# Patient Record
Sex: MALE | Race: WHITE
[De-identification: names, ages, dates, MRNs, and addresses within clinical notes are randomized per-mention and may not be internally consistent; named-entity substitution may affect disease eponyms.]

---

## 2018-06-02 ENCOUNTER — HOSPITAL ENCOUNTER (EMERGENCY)
Dept: HOSPITAL 89 - ER | Age: 68
LOS: 1 days | Discharge: HOME | End: 2018-06-03
Payer: MEDICARE

## 2018-06-02 VITALS — DIASTOLIC BLOOD PRESSURE: 80 MMHG | SYSTOLIC BLOOD PRESSURE: 160 MMHG

## 2018-06-02 DIAGNOSIS — R04.2: ICD-10-CM

## 2018-06-02 DIAGNOSIS — J18.1: Primary | ICD-10-CM

## 2018-06-02 LAB
INR PPP: 0.98
PLATELET COUNT, AUTOMATED: 517 K/UL (ref 150–450)

## 2018-06-02 PROCEDURE — 82247 BILIRUBIN TOTAL: CPT

## 2018-06-02 PROCEDURE — 84155 ASSAY OF PROTEIN SERUM: CPT

## 2018-06-02 PROCEDURE — 84460 ALANINE AMINO (ALT) (SGPT): CPT

## 2018-06-02 PROCEDURE — 84520 ASSAY OF UREA NITROGEN: CPT

## 2018-06-02 PROCEDURE — 82310 ASSAY OF CALCIUM: CPT

## 2018-06-02 PROCEDURE — 82565 ASSAY OF CREATININE: CPT

## 2018-06-02 PROCEDURE — 82435 ASSAY OF BLOOD CHLORIDE: CPT

## 2018-06-02 PROCEDURE — 84075 ASSAY ALKALINE PHOSPHATASE: CPT

## 2018-06-02 PROCEDURE — 85025 COMPLETE CBC W/AUTO DIFF WBC: CPT

## 2018-06-02 PROCEDURE — 84450 TRANSFERASE (AST) (SGOT): CPT

## 2018-06-02 PROCEDURE — 84295 ASSAY OF SERUM SODIUM: CPT

## 2018-06-02 PROCEDURE — 85730 THROMBOPLASTIN TIME PARTIAL: CPT

## 2018-06-02 PROCEDURE — 84132 ASSAY OF SERUM POTASSIUM: CPT

## 2018-06-02 PROCEDURE — 96361 HYDRATE IV INFUSION ADD-ON: CPT

## 2018-06-02 PROCEDURE — 82374 ASSAY BLOOD CARBON DIOXIDE: CPT

## 2018-06-02 PROCEDURE — 82947 ASSAY GLUCOSE BLOOD QUANT: CPT

## 2018-06-02 PROCEDURE — 81001 URINALYSIS AUTO W/SCOPE: CPT

## 2018-06-02 PROCEDURE — 82040 ASSAY OF SERUM ALBUMIN: CPT

## 2018-06-02 PROCEDURE — 71275 CT ANGIOGRAPHY CHEST: CPT

## 2018-06-02 PROCEDURE — 99284 EMERGENCY DEPT VISIT MOD MDM: CPT

## 2018-06-02 PROCEDURE — 96360 HYDRATION IV INFUSION INIT: CPT

## 2018-06-02 PROCEDURE — 71046 X-RAY EXAM CHEST 2 VIEWS: CPT

## 2018-06-02 PROCEDURE — 85610 PROTHROMBIN TIME: CPT

## 2018-06-02 NOTE — ER REPORT
History and Physical


Time Seen By MD:  22:25


Hx. of Stated Complaint:  


PATIENT IS ON BLOOD THINNERS; COUGHED UP SOME BLOOD TWICE TONIGHT


HPI/ROS


CHIEF COMPLAINT: coughing with some blood





HISTORY OF PRESENT ILLNESS: This is a 67 year old male. He is a , 

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 

of the country. He had 3 episodes of coughing when he needed to clear phlegm 

and had some blood. No blood with coughing since. No vomiting, and no stomach 

discomfort. He denies other bleeding such as bloody noses. He does bruise 

easily since he is on Plavix for peripheral arterial disease. No blood in urine 

and no blood in stools. No fevers or chills. He does smoke and has chronic  

cough, no worse than usual. No shortness of breath or chest pain. No prior 

history of bleeding in lungs, but did have pneumonia 4 moths ago.


Allergies:  


Coded Allergies:  


     doxycycline (Verified  Allergy, Unknown, 6/2/18)


Home Meds


Active Scripts


Levofloxacin 500 Mg Tab (LEVOFLOXACIN 500 MG TAB) 500 Mg Tablet, 500 MG PO QDAY

, #10 TAB 0 Refills


   Prov:SREE RIVERA MD         6/3/18


Reported Medications


Clopidogrel Bisulfate (CLOPIDOGREL) 75 Mg Tablet, 1 TAB PO QDAY, TAB


   6/2/18


Omeprazole (OMEPRAZOLE) 20 Mg Capsule.dr, 1 CAP PO BID, CAP


   6/2/18


Clopidogrel Bisulfate (PLAVIX) 75 Mg Tablet, 1 TAB PO QDAY, TAB


   6/2/18


Lisinopril (LISINOPRIL) 10 Mg Tablet, 10 MG PO QDAY, TAB


   6/2/18


Hctz/Bisoprolol (ZIAC 10-6.25 MG TABLET) 1 Ea Tab, 12.5 MG PO QDAY, TAB


   6/2/18


Guaifenesin (Guaifenesin ER) 600 Mg Tab.er.12h, 400 MG PO


   6/2/18


Reviewed Nurses Notes:  Yes


Hx Substance Use Disorder:  No


Hx Alcohol Use:  No


Constitutional





Vital Sign - Last 24 Hours








 6/2/18





 22:13


 


Temp 98.4


 


Pulse 95


 


Resp 18


 


B/P (MAP) 160/80


 


Pulse Ox 96


 


O2 Delivery Room Air








Physical Exam


   General Appearance: The patient is alert. No acute distress.


Eyes: Pupils are equal, round. Reactive to light. No pallor, injection or 

icterus. Extraocular movements are intact.


ENT: Mucous membranes are moist. Normal oral mucosa. Posterior oropharynx is 

normal. Normal nasal mucosa. Normal tympanic membranes and canals. 


Neck: Supple and non tender. No lymphadenopathy.


Respiratory: Breathing easily and unlabored. Lungs are clear to auscultation.


Cardiovascular: Regular rate and rhythm. No murmurs, gallops or rubs. Normal 

capillary refill. No edema.


Gastrointestinal:  Abdomen is soft and non tender. Nondistended. Normal active 

bowel sounds. No costovertebral angle tenderness with percussion.


Neurological: Alert and oriented x3.


Skin: Warm and dry. No rashes. Has a few scattered bruises on his arms.


Musculoskeletal: Extremities are nontender. No tenderness in palpation of the 

cervical, thoracic and lumbar spine.





DIFFERENTIAL DIAGNOSIS: After history and physical exam, differential diagnosis 

was considered for hemoptysis, likely surface vessel that burst when he was 

coughing and bleeding on the blood thinner but we'll look for pneumonia, and 

other sources of bleeding including looking at the blood counts.





Medical Decision Making


Data Points


Result Diagram:  


6/2/18 2300                                                                    

            6/2/18 2300





Laboratory





Hematology








Test


  6/2/18


23:00


 


Red Blood Count


  4.33 M/uL


(4.00-5.60)


 


Mean Corpuscular Volume


  65.2 fL


(80.0-96.0)


 


Mean Corpuscular Hemoglobin


  21.6 pg


(26.0-33.0)


 


Mean Corpuscular Hemoglobin


Concent 33.1 g/dL


(32.0-36.0)


 


Red Cell Distribution Width


  18.2 %


(11.5-14.5)


 


Mean Platelet Volume


  6.5 fL


(7.2-11.1)


 


Neutrophils (%) (Auto)


  57.7 %


(39.4-72.5)


 


Lymphocytes (%) (Auto)


  34.2 %


(17.6-49.6)


 


Monocytes (%) (Auto)


  5.6 %


(4.1-12.4)


 


Eosinophils (%) (Auto)


  2.1 %


(0.4-6.7)


 


Basophils (%) (Auto)


  0.4 %


(0.3-1.4)


 


Nucleated RBC Relative Count


(auto) 0.0 /100WBC 


 


 


Neutrophils # (Auto)


  4.2 K/uL


(2.0-7.4)


 


Lymphocytes # (Auto)


  2.5 K/uL


(1.3-3.6)


 


Monocytes # (Auto)


  0.4 K/uL


(0.3-1.0)


 


Eosinophils # (Auto)


  0.2 K/uL


(0.0-0.5)


 


Basophils # (Auto)


  0.0 K/uL


(0.0-0.1)


 


Nucleated RBC Absolute Count


(auto) 0.00 K/uL 


 


 


Peripheral Blood Smear Yes Y/N 


 


Prothrombin Time


  13.0 seconds


(12.0-14.4)


 


Prothromb Time International


Ratio 0.98 


 


 


Activated Partial


Thromboplast Time 34 seconds


(23-35)


 


Urine Color Straw 


 


Urine Clarity Clear 


 


Urine pH


  7.0 pH


(4.8-9.5)


 


Urine Specific Gravity 1.004 


 


Urine Protein


  Negative mg/dL


(NEGATIVE)


 


Urine Glucose (UA)


  Negative mg/dL


(NEGATIVE)


 


Urine Ketones


  Negative mg/dL


(NEGATIVE)


 


Urine Blood


  Negative


(NEGATIVE)


 


Urine Nitrite


  Negative


(NEGATIVE)


 


Urine Bilirubin


  Negative


(NEGATIVE)


 


Urine Urobilinogen


  Negative mg/dL


(0.2-1.9)


 


Urine Leukocyte Esterase


  Negative


(NEGATIVE)


 


Urine RBC


  None /HPF


(0-2/HPF)


 


Urine WBC


  <1 /HPF


(0-5/HPF)


 


Urine Squamous Epithelial


Cells None /LPF


(</=FEW)


 


Urine Bacteria


  Negative /HPF


(NONE-FEW)


 


Urine Mucus


  None /HPF


(NONE-FEW)


 


Sodium Level


  127 mmol/L


(137-145)


 


Potassium Level


  3.9 mmol/L


(3.5-5.0)


 


Chloride Level


  90 mmol/L


()


 


Carbon Dioxide Level


  28 mmol/L


(22-30)


 


Blood Urea Nitrogen


  11 mg/dl


(9-21)


 


Creatinine


  1.00 mg/dl


(0.66-1.25)


 


Glomerular Filtration Rate


Calc > 60.0 


 


 


Random Glucose


  89 mg/dl


()


 


Calcium Level


  9.4 mg/dl


(8.4-10.2)


 


Total Bilirubin


  0.3 mg/dl


(0.2-1.3)


 


Aspartate Amino Transf


(AST/SGOT) 18 U/L (0-35) 


 


 


Alanine Aminotransferase


(ALT/SGPT) 18 U/L (0-56) 


 


 


Alkaline Phosphatase


  105 U/L


(0-126)


 


Total Protein


  6.9 gm/dl


(6.3-8.2)


 


Albumin


  3.8 g/dl


(3.5-5.0)








Chemistry








Test


  6/2/18


23:00


 


White Blood Count


  7.3 k/uL


(4.5-11.0)


 


Red Blood Count


  4.33 M/uL


(4.00-5.60)


 


Hemoglobin


  9.3 g/dL


(14.0-18.0)


 


Hematocrit


  28.2 %


(42.0-52.0)


 


Mean Corpuscular Volume


  65.2 fL


(80.0-96.0)


 


Mean Corpuscular Hemoglobin


  21.6 pg


(26.0-33.0)


 


Mean Corpuscular Hemoglobin


Concent 33.1 g/dL


(32.0-36.0)


 


Red Cell Distribution Width


  18.2 %


(11.5-14.5)


 


Platelet Count


  517 K/uL


(150-450)


 


Mean Platelet Volume


  6.5 fL


(7.2-11.1)


 


Neutrophils (%) (Auto)


  57.7 %


(39.4-72.5)


 


Lymphocytes (%) (Auto)


  34.2 %


(17.6-49.6)


 


Monocytes (%) (Auto)


  5.6 %


(4.1-12.4)


 


Eosinophils (%) (Auto)


  2.1 %


(0.4-6.7)


 


Basophils (%) (Auto)


  0.4 %


(0.3-1.4)


 


Nucleated RBC Relative Count


(auto) 0.0 /100WBC 


 


 


Neutrophils # (Auto)


  4.2 K/uL


(2.0-7.4)


 


Lymphocytes # (Auto)


  2.5 K/uL


(1.3-3.6)


 


Monocytes # (Auto)


  0.4 K/uL


(0.3-1.0)


 


Eosinophils # (Auto)


  0.2 K/uL


(0.0-0.5)


 


Basophils # (Auto)


  0.0 K/uL


(0.0-0.1)


 


Nucleated RBC Absolute Count


(auto) 0.00 K/uL 


 


 


Peripheral Blood Smear Yes Y/N 


 


Prothrombin Time


  13.0 seconds


(12.0-14.4)


 


Prothromb Time International


Ratio 0.98 


 


 


Activated Partial


Thromboplast Time 34 seconds


(23-35)


 


Urine Color Straw 


 


Urine Clarity Clear 


 


Urine pH


  7.0 pH


(4.8-9.5)


 


Urine Specific Gravity 1.004 


 


Urine Protein


  Negative mg/dL


(NEGATIVE)


 


Urine Glucose (UA)


  Negative mg/dL


(NEGATIVE)


 


Urine Ketones


  Negative mg/dL


(NEGATIVE)


 


Urine Blood


  Negative


(NEGATIVE)


 


Urine Nitrite


  Negative


(NEGATIVE)


 


Urine Bilirubin


  Negative


(NEGATIVE)


 


Urine Urobilinogen


  Negative mg/dL


(0.2-1.9)


 


Urine Leukocyte Esterase


  Negative


(NEGATIVE)


 


Urine RBC


  None /HPF


(0-2/HPF)


 


Urine WBC


  <1 /HPF


(0-5/HPF)


 


Urine Squamous Epithelial


Cells None /LPF


(</=FEW)


 


Urine Bacteria


  Negative /HPF


(NONE-FEW)


 


Urine Mucus


  None /HPF


(NONE-FEW)


 


Glomerular Filtration Rate


Calc > 60.0 


 


 


Calcium Level


  9.4 mg/dl


(8.4-10.2)


 


Total Bilirubin


  0.3 mg/dl


(0.2-1.3)


 


Aspartate Amino Transf


(AST/SGOT) 18 U/L (0-35) 


 


 


Alanine Aminotransferase


(ALT/SGPT) 18 U/L (0-56) 


 


 


Alkaline Phosphatase


  105 U/L


(0-126)


 


Total Protein


  6.9 gm/dl


(6.3-8.2)


 


Albumin


  3.8 g/dl


(3.5-5.0)








Coagulation








Test


  6/2/18


23:00


 


Prothrombin Time 13.0 seconds 


 


Prothromb Time International


Ratio 0.98 


 


 


Activated Partial


Thromboplast Time 34 seconds 


 








Urinalysis








Test


  6/2/18


23:00


 


Urine Color Straw 


 


Urine Clarity Clear 


 


Urine pH


  7.0 pH


(4.8-9.5)


 


Urine Specific Gravity 1.004 


 


Urine Protein


  Negative mg/dL


(NEGATIVE)


 


Urine Glucose (UA)


  Negative mg/dL


(NEGATIVE)


 


Urine Ketones


  Negative mg/dL


(NEGATIVE)


 


Urine Blood


  Negative


(NEGATIVE)


 


Urine Nitrite


  Negative


(NEGATIVE)


 


Urine Bilirubin


  Negative


(NEGATIVE)


 


Urine Urobilinogen


  Negative mg/dL


(0.2-1.9)


 


Urine Leukocyte Esterase


  Negative


(NEGATIVE)


 


Urine RBC


  None /HPF


(0-2/HPF)


 


Urine WBC


  <1 /HPF


(0-5/HPF)


 


Urine Squamous Epithelial


Cells None /LPF


(</=FEW)


 


Urine Bacteria


  Negative /HPF


(NONE-FEW)


 


Urine Mucus


  None /HPF


(NONE-FEW)











EKG/Imaging


Imaging


TWO VIEW CHEST 6/2/2018 10:34 PM.


 


INDICATION: coughing with blood


 


COMPARISON: None available.


 


FINDINGS: Lungs are overall hyperexpanded.  There is dense opacification of the 

left lower lobe.  The probable nipple shadow over the right lower lung.  No 

definite pneumothorax or pleural effusion.  Pulmonary vasculature is 

unremarkable.  Heart size is normal.


 


IMPRESSION:


 


1.  Dense opacification of the left lower lung represent pneumonia or 

atelectasis.  Consider follow-up evaluation at resolution of symptoms versus 

further characterization with CT to exclude underlying lesion.


 


2.  Probable chronic hyperexpansion.


 


Report Dictated By: Jean Paul Kurtz MD at 6/2/2018 11:11 PM








CT PE


 


DATE:  6/3/2018 12:06 AM


 


INDICATION:  Hemoptysis, abnormal chest radiographs.


 


COMPARISON:  Same-day radiographs.


 


TECHNIQUE:  Axial CT angiogram was obtained through the chest with intravenous 

contrast.  Sagittal and coronal MPR and MIP coronal reformations were also 

generated.  75 mL isovue 370.  One of the following dose optimization 

techniques was utilized in the performance of this exam: Automated exposure 

control; adjustment of the mA and/or kV according to the patient's size; or use 

of an iterative  reconstruction technique.  Specific details can be referenced 

in the facility's radiology CT exam operational policy.


 


FINDINGS:


 


Thyroid / Thoracic Inlet:  No visualized thyroid nodule or supraclavicular 

lymphadenopathy.


 


Pulmonary Arteries:  No pulmonary embolism.


 


Heart and Aorta:  Normal-size heart with no pericardial effusion.  Coronary 

artery calcifications.  Nonaneurysmal thoracic aorta with mild atherosclerosis.


 


Mediastinum and Doreen: There is mediastinal.  Example subaortic node on image 

144 series 4 measures 31 x 16 mm.


 


Lungs and Pleura: Near complete consolidation of the there is near complete 

consolidation of the left lower lobe base.  Adjacent septal thickening.  The 

associated bronchus is occluded.  No well-defined focal mass.  Calcified 

granulomas noted in the left lower lobe.  Right lower lobe nodule on image 291 

series 4 measures 9 x 6 mm.  Mild emphysema.


 


Breast and Axilla:  No axillary lymphadenopathy.


 


Upper Abdomen: Probable exophytic cyst at the posterior aspect of the left 

kidney.  There is an ovoid exophytic lesion at the superior pole of the right 

kidney there are measures 2.8 cm in diameter and greater than simple fluid 

density.


 


Bones and Soft Tissues:  No suspicious osseous or soft tissue abnormality.


 


IMPRESSION:


 


1.  No pulmonary embolism.


 


2.  Near complete consolidation of the left lower lobe base with occlusion of 

the associated bronchus.  No well-demonstrated associated mass, though it is 

not excluded, and the patient may benefit from short-term follow-up and/or 

bronchoscopy.  Infection, aspiration or mucous plugging are also considerations.


 


3.  Nonspecific mediastinal adenopathy.  Recommend attention to this on follow-

up.


 


4.  9 x 6 mm right lower lobe pulmonary nodule.  Current Fleischner Society 

recommendations for incidental pulmonary nodules 6-8mm (average of long and 

short axis):


-  In a patient with low risk for malignancy (i.e., minimal or absent smoking 

history, no known malignancy or other risk factors): Noncontrast CT at 6-12 

months, then consider CT at 18-24 months.


-  In a patient at high risk for malignancy (e.g., smoking history and/or other 

known risk factors): Noncontrast CT at 6-12 months, then CT at 18-24 months. If 

unchanged no further follow-up necessary.


Brenda GALLAGHER, Aviva RAZO, Dax J, et al. Guidelines for management of small 

pulmonary nodules detected on CT images: from the Fleischner society 2017.


 


5.  2.8 cm exophytic lesion at the superior pole right kidney.  This measures 

greater than simple fluid density end could represent a proteinaceous/

hemorrhagic cyst or a solid lesion.  Consider nonemergent evaluation with 

ultrasound.


 


Report Dictated By: Jean Paul Kurtz MD at 6/3/2018 12:06 AM





ED Course/Re-evaluation


Clinical Indication for ER IV:  Hydration, IV Access


ED Course


After initial evaluation, vitals are stable. The patient feels alright and just 

stopped because he was told it was a good idea with coughing blood. Labs 

obtained with IV start and chest x-ray ordered. He has mild anemia, unsure what 

his baseline is. Platelets elevated, likely due to smoking. Normal white blood 

cell count and differential. His sodium was low at 127, the other electrolytes, 

kidney function and liver function was normal. Urinalysis and coagulation 

factors were normal. His chest x-ray shows consolidation and some effusion in 

left lower lobe. He does not remember where his prior pneumonia was. 





CTA was done. Negative for pulmonary embolism. He has consolidation of the 

lower lobe on the left. Can not rule out mass, mucous plugging, infection, 

aspiration. Will begin treatment with Levofloxacin 500mg once a day. He will 

need to follow-up as soon as he returns home. He also has non-specific 

mediastinal adenopathy. Has a lesion on the lower pole of the right kidney that 

will need follow-up as well.





Discussed the results with the patient. We talked about the need for follow-up. 

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 Discussed 

that if he is worsening, such as fevers/chills, short of breath, coughing up 

more blood, or other problems that he needs to stop and seek medical attention 

sooner. I would not recommend any changes to his mediation at this time other 

than adding the antibiotic. He has his inhalers that he can continue to use as 

well.


Decision to Disposition Date:  Agustin 3, 2018


Decision to Disposition Time:  00:52





Depart


Departure


Latest Vital Signs





Vital Signs








  Date Time  Temp Pulse Resp B/P (MAP) Pulse Ox O2 Delivery O2 Flow Rate FiO2


 


6/2/18 22:13 98.4 95 18 160/80 96 Room Air  








Impression:  


 Primary Impression:  


 Lung consolidation


 Additional Impression:  


 Hemoptysis, unspecified


Condition:  Condition Unchanged


Disposition:  HOME OR SELF-CARE


New Scripts


Levofloxacin 500 Mg Tab (LEVOFLOXACIN 500 MG TAB) 500 Mg Tablet


500 MG PO QDAY, #10 TAB 0 Refills


   Prov: SREE RIVERA MD         6/3/18


Patient Instructions:  Community Acquired Pneumonia (ED)





Additional Instructions:  


Your lung has fluid in the left lower lobe. We do not know for sure what the 

cause of this is.


It could represent a pneumonia, but we need you to see your regular doctor for 

follow-up in the next week for re-evaluation. Sooner if you are feeling worse.


We are going to start you on the antibiotic Levofloxacin 500mg once a day for 

10 days. 


You will need to stop and seek further medical attention right away should you 

have fevers/chills, shortness of breath, or worsening of coughing with blood 

production.


Your doctor will need to repeat imaging and labs testing as we discussed.


Your sodium was low, and we replaced this with the IV fluids we gave. You can 

also start eating some extra salty snacks to get more sodium through your diet 

until you see your doctor.





Problem Qualifiers











SREE RIVERA MD Jun 2, 2018 22:24

## 2018-06-02 NOTE — RADIOLOGY IMAGING REPORT
FACILITY: [facility] 

 

PATIENT NAME: Franck Molina

: 1950

MR: [id number]

V: [id number]

EXAM DATE: 

ORDERING PHYSICIAN: SREE RIVERA

TECHNOLOGIST: 

 

[place] [facility] [place]: Franck Mol[place]

: 1950

MRN: [id number]

Visit/Account:[id number]

Date of Sevice:  2018

 

ACCESSION #: [id number]

 

TWO VIEW CHEST 2018 10:34 PM.

 

INDICATION: coughing with blood

 

COMPARISON: None available.

 

FINDINGS: Lungs are overall hyperexpanded.  There is dense opacification of the left lower lobe.  The
 probable nipple shadow over the right lower lung.  No definite pneumothorax or pleural effusion.  Pu
lmonary vasculature is unremarkable.  Heart size is normal.

 

IMPRESSION:

 

1.  Dense opacification of the left lower lung represent pneumonia or atelectasis.  Consider follow-u
p evaluation at resolution of symptoms versus further characterization with CT to exclude underlying 
lesion.

 

2.  Probable chronic hyperexpansion.

 

Report Dictated By: Jean Paul Kurtz MD at 2018 11:11 PM

 

Report E-Signed By: Jean Paul Kurtz MD  at 2018 11:14 PM

 

WSN:[id number]

## 2018-06-03 NOTE — RADIOLOGY IMAGING REPORT
FACILITY: [facility] [place] 

 

PATIENT NAME: Franck Molina

: 1950

MR: [id number]

V: [id number]

EXAM DATE: 

ORDERING PHYSICIAN: SREE RIVERA

TECHNOLOGIST: 

 

[place] [facility] [place]

Patient: Franck Molina

: 1950

MRN: [id number]

Visit/Account:[id number]

Date of Sevice:  2018

 

ACCESSION #: [id number]

 

CT PE

 

DATE:  6/3/2018 12:06 AM

 

INDICATION:  Hemoptysis, abnormal chest radiographs.

 

COMPARISON:  Same-day radiographs.

 

TECHNIQUE:  Axial CT angiogram was obtained through the chest with intravenous contrast.  Sagittal an
d coronal MPR and MIP coronal reformations were also generated.  75 mL isovue 370.  One of the follow
ing dose optimization techniques was utilized in the performance of this exam: Automated exposure con
trol; adjustment of the mA and/or kV according to the patient's size; or use of an iterative  reconst
ruction technique.  Specific details can be referenced in the facility's radiology CT exam operationa
l policy.

 

FINDINGS:

 

Thyroid / Thoracic Inlet:  No visualized thyroid nodule or supraclavicular lymphadenopathy.

 

Pulmonary Arteries:  No pulmonary embolism.

 

Heart and Aorta:  Normal-size heart with no pericardial effusion.  Coronary artery calcifications.  N
onaneurysmal thoracic aorta with mild atherosclerosis.

 

Mediastinum and Doreen: There is mediastinal.  Example subaortic node on image 144 series 4 measures 31
 x 16 mm.

 

Lungs and Pleura: Near complete consolidation of the there is near complete consolidation of the left
 lower lobe base.  Adjacent septal thickening.  The associated bronchus is occluded.  No well-defined
 focal mass.  Calcified granulomas noted in the left lower lobe.  Right lower lobe nodule on image 29
1 series 4 measures 9 x 6 mm.  Mild emphysema.

 

Breast and Axilla:  No axillary lymphadenopathy.

 

Upper Abdomen: Probable exophytic cyst at the posterior aspect of the left kidney.  There is an ovoid
 exophytic lesion at the superior pole of the right kidney there are measures 2.8 cm in diameter and 
greater than simple fluid density.

 

Bones and Soft Tissues:  No suspicious osseous or soft tissue abnormality.

 

IMPRESSION:

 

1.  No pulmonary embolism.

 

2.  Near complete consolidation of the left lower lobe base with occlusion of the associated bronchus
.  No well-demonstrated associated mass, though it is not excluded, and the patient may benefit from 
short-term follow-up and/or bronchoscopy.  Infection, aspiration or mucous plugging are also consider
ations.

 

3.  Nonspecific mediastinal adenopathy.  Recommend attention to this on follow-up.

 

4.  9 x 6 mm right lower lobe pulmonary nodule.  Current Fleischner Society recommendations for incid
ental pulmonary nodules 6-8mm (average of long and short axis):

-  In a patient with low risk for malignancy (i.e., minimal or absent smoking history, no known malig
ellen or other risk factors): Noncontrast CT at 6-12 months, then consider CT at 18-24 months.

-  In a patient at high risk for malignancy (e.g., smoking history and/or other known risk factors): 
Noncontrast CT at 6-12 months, then CT at 18-24 months. If unchanged no further follow-up necessary.

Brenda H, Aviva D, Gabeo J, et al. Guidelines for management of small pulmonary nodules detected on
 CT images: from the Fleischner society 2017.

 

5.  2.8 cm exophytic lesion at the superior pole right kidney.  This measures greater than simple flu
id density end could represent a proteinaceous/hemorrhagic cyst or a solid lesion.  Consider nonemerg
ent evaluation with ultrasound.

 

Report Dictated By: Jean Paul Kurtz MD at 6/3/2018 12:06 AM

 

Report E-Signed By: Jean Paul Kurtz MD  at 6/3/2018 12:23 AM

 

WSN:[id number]